# Patient Record
Sex: FEMALE | Race: WHITE | NOT HISPANIC OR LATINO | Employment: OTHER | URBAN - METROPOLITAN AREA
[De-identification: names, ages, dates, MRNs, and addresses within clinical notes are randomized per-mention and may not be internally consistent; named-entity substitution may affect disease eponyms.]

---

## 2017-05-10 NOTE — PATIENT DISCUSSION
(H01.004) Unspecified blepharitis left upper eyelid - Assesment : Examination revealed Blepharitis. - Plan : See Plan #1.

## 2017-05-10 NOTE — PATIENT DISCUSSION
(H00.011) Hordeolum externum right upper eyelid - Assesment : Examination revealed external hordeolum RUL. Mild hordeolum RLL as well. - Plan : Lower Keflex to 1 C PO BID. Continue warm compresses OU BID morning and evening to increase comfort and soften oil glands. Azasite OU BID (sample given to patient). Saroj mask recommended to ease compliance of warm compresses. Baby shampoo washes can be discontinued as they are irritating eyes. Ocusoft lid scrubs recommended in evenings to remove makeup and clean around eyes. Omega 3 / Fish Oil daily. Tobradex NEVILLE around OD to ease discomfort, but not necessary. Consider eyelid treatments routinely in future for mechanical removal of bacteria. RV in 1 Week for Follow Up. Sooner if having worsening symptoms.

## 2017-05-10 NOTE — PATIENT DISCUSSION
(H01.001) Unspecified blepharitis right upper eyelid - Assesment : Examination revealed Blepharitis. - Plan : See Plan #1.

## 2017-05-17 NOTE — PATIENT DISCUSSION
(H00.011) Hordeolum externum right upper eyelid - Assesment : Examination revealed external hordeolum RUL - improving. Lid everted OD - no FB evident. - Plan : Continue Keflex to 1 C PO BID until bottle runs out and continue warm compresses OU BID morning and evening to increase comfort and soften oil glands. Patient is leaving up [facility] this week. May consider lid hygiene when patient returns. Patient unsure when she is returning. RV PRN, sooner if symptoms worsen.

## 2017-05-17 NOTE — PATIENT DISCUSSION
(H01.001) Unspecified blepharitis right upper eyelid - Assesment : Examination revealed Blepharitis - improving. - Plan : See Plan #1.

## 2017-05-17 NOTE — PATIENT DISCUSSION
(H01.004) Unspecified blepharitis left upper eyelid - Assesment : Examination revealed Blepharitis - improving. - Plan : See Plan #1.

## 2019-12-11 ENCOUNTER — NEW PATIENT COMPREHENSIVE (OUTPATIENT)
Dept: URBAN - METROPOLITAN AREA CLINIC 32 | Facility: CLINIC | Age: 54
End: 2019-12-11

## 2019-12-11 DIAGNOSIS — Z98.890: ICD-10-CM

## 2019-12-11 DIAGNOSIS — H43.812: ICD-10-CM

## 2019-12-11 DIAGNOSIS — H25.13: ICD-10-CM

## 2019-12-11 PROCEDURE — 92225 OPHTHALMOSCOPY (INITIAL): CPT

## 2019-12-11 PROCEDURE — 92004 COMPRE OPH EXAM NEW PT 1/>: CPT

## 2019-12-11 PROCEDURE — G8482 FLU IMMUNIZE ORDER/ADMIN: HCPCS

## 2019-12-11 PROCEDURE — G8428 CUR MEDS NOT DOCUMENT: HCPCS

## 2019-12-11 PROCEDURE — 1036F TOBACCO NON-USER: CPT

## 2019-12-11 PROCEDURE — 92015 DETERMINE REFRACTIVE STATE: CPT

## 2019-12-11 PROCEDURE — G9903 PT SCRN TBCO ID AS NON USER: HCPCS

## 2019-12-11 PROCEDURE — 4040F PNEUMOC VAC/ADMIN/RCVD: CPT

## 2019-12-11 PROCEDURE — G8785 BP SCRN NO PERF AT INTERVAL: HCPCS

## 2019-12-11 ASSESSMENT — KERATOMETRY
OS_K2POWER_DIOPTERS: 31.25
OD_K1POWER_DIOPTERS: 42.5
OS_K1POWER_DIOPTERS: 42
OS_AXISANGLE_DEGREES: 105
OD_AXISANGLE2_DEGREES: 170
OD_AXISANGLE_DEGREES: 80
OD_K2POWER_DIOPTERS: 41.75
OS_AXISANGLE2_DEGREES: 15

## 2019-12-11 ASSESSMENT — TONOMETRY
OS_IOP_MMHG: 11
OD_IOP_MMHG: 13

## 2019-12-11 ASSESSMENT — VISUAL ACUITY
OS_SC: 20/20-1
OS_CC: J1
OD_SC: 20/20-1
OD_SC: J5
OD_CC: J1+
OS_SC: J7

## 2020-01-16 ASSESSMENT — KERATOMETRY
OD_AXISANGLE_DEGREES: 80
OD_K1POWER_DIOPTERS: 42.5
OS_K1POWER_DIOPTERS: 42
OS_AXISANGLE_DEGREES: 105
OD_K2POWER_DIOPTERS: 41.75
OD_AXISANGLE2_DEGREES: 170
OS_AXISANGLE2_DEGREES: 15
OS_K2POWER_DIOPTERS: 31.25

## 2020-01-17 ENCOUNTER — RETINA CONSULT (OUTPATIENT)
Dept: URBAN - METROPOLITAN AREA CLINIC 32 | Facility: CLINIC | Age: 55
End: 2020-01-17

## 2020-01-17 DIAGNOSIS — H43.812: ICD-10-CM

## 2020-01-17 DIAGNOSIS — H35.30: ICD-10-CM

## 2020-01-17 DIAGNOSIS — H43.811: ICD-10-CM

## 2020-01-17 PROCEDURE — 92250 FUNDUS PHOTOGRAPHY W/I&R: CPT

## 2020-01-17 PROCEDURE — 92014 COMPRE OPH EXAM EST PT 1/>: CPT

## 2020-01-17 PROCEDURE — 92201 OPSCPY EXTND RTA DRAW UNI/BI: CPT

## 2020-01-17 ASSESSMENT — TONOMETRY
OD_IOP_MMHG: 14
OS_IOP_MMHG: 12

## 2020-01-17 ASSESSMENT — VISUAL ACUITY
OS_SC: 20/20-1
OD_SC: 20/20

## 2021-12-21 ENCOUNTER — COMPREHENSIVE EXAM (OUTPATIENT)
Dept: URBAN - METROPOLITAN AREA CLINIC 32 | Facility: CLINIC | Age: 56
End: 2021-12-21

## 2021-12-21 DIAGNOSIS — H25.13: ICD-10-CM

## 2021-12-21 PROCEDURE — 92014 COMPRE OPH EXAM EST PT 1/>: CPT

## 2021-12-21 PROCEDURE — 92015 DETERMINE REFRACTIVE STATE: CPT

## 2021-12-21 ASSESSMENT — VISUAL ACUITY
OD_SC: 20/20
OS_CC: J2+2
OS_SC: J16
OS_SC: 20/20-2
OD_GLARE: 20/60
OD_SC: J10
OD_CC: J1
OS_GLARE: 20/60

## 2021-12-21 ASSESSMENT — KERATOMETRY
OD_AXISANGLE2_DEGREES: 170
OD_AXISANGLE_DEGREES: 80
OS_AXISANGLE2_DEGREES: 15
OS_K2POWER_DIOPTERS: 31.25
OS_AXISANGLE_DEGREES: 105
OD_K1POWER_DIOPTERS: 42.5
OD_K2POWER_DIOPTERS: 41.75
OS_K1POWER_DIOPTERS: 42

## 2021-12-21 ASSESSMENT — TONOMETRY
OS_IOP_MMHG: 16
OD_IOP_MMHG: 13

## 2022-06-04 ENCOUNTER — TELEPHONE ENCOUNTER (OUTPATIENT)
Dept: URBAN - METROPOLITAN AREA CLINIC 68 | Facility: CLINIC | Age: 57
End: 2022-06-04

## 2022-06-04 RX ORDER — SODIUM SULFATE, POTASSIUM SULFATE, MAGNESIUM SULFATE 17.5; 3.13; 1.6 G/ML; G/ML; G/ML
SOLUTION, CONCENTRATE ORAL AS DIRECTED
Qty: 1 | Refills: 0 | OUTPATIENT
Start: 2019-06-28 | End: 2019-06-29

## 2022-06-05 ENCOUNTER — TELEPHONE ENCOUNTER (OUTPATIENT)
Dept: URBAN - METROPOLITAN AREA CLINIC 68 | Facility: CLINIC | Age: 57
End: 2022-06-05

## 2022-06-25 ENCOUNTER — TELEPHONE ENCOUNTER (OUTPATIENT)
Age: 57
End: 2022-06-25

## 2022-06-25 RX ORDER — SODIUM SULFATE, POTASSIUM SULFATE, MAGNESIUM SULFATE 17.5; 3.13; 1.6 G/ML; G/ML; G/ML
SOLUTION, CONCENTRATE ORAL AS DIRECTED
Qty: 1 | Refills: 0 | OUTPATIENT
Start: 2019-06-28 | End: 2019-06-29

## 2022-06-26 ENCOUNTER — TELEPHONE ENCOUNTER (OUTPATIENT)
Age: 57
End: 2022-06-26

## 2023-06-22 ENCOUNTER — COMPREHENSIVE EXAM (OUTPATIENT)
Dept: URBAN - METROPOLITAN AREA CLINIC 32 | Facility: CLINIC | Age: 58
End: 2023-06-22

## 2023-06-22 DIAGNOSIS — H16.223: ICD-10-CM

## 2023-06-22 DIAGNOSIS — H02.834: ICD-10-CM

## 2023-06-22 DIAGNOSIS — H43.812: ICD-10-CM

## 2023-06-22 DIAGNOSIS — H02.831: ICD-10-CM

## 2023-06-22 DIAGNOSIS — H57.813: ICD-10-CM

## 2023-06-22 DIAGNOSIS — H25.13: ICD-10-CM

## 2023-06-22 PROCEDURE — 92015 DETERMINE REFRACTIVE STATE: CPT

## 2023-06-22 PROCEDURE — 99214 OFFICE O/P EST MOD 30 MIN: CPT

## 2023-06-22 PROCEDURE — 92134 CPTRZ OPH DX IMG PST SGM RTA: CPT

## 2023-06-22 ASSESSMENT — VISUAL ACUITY
OD_SC: J4
OD_SC: 20/20-2
OS_SC: J16
OS_CC: J2
OS_SC: 20/25
OD_CC: J1
OS_GLARE: 20/60-2
OD_GLARE: 20/30

## 2023-06-22 ASSESSMENT — KERATOMETRY
OS_AXISANGLE2_DEGREES: 16
OS_AXISANGLE_DEGREES: 106
OD_AXISANGLE_DEGREES: 80
OS_K2POWER_DIOPTERS: 41.25
OD_AXISANGLE2_DEGREES: 169
OD_AXISANGLE_DEGREES: 79
OS_K1POWER_DIOPTERS: 42
OS_AXISANGLE2_DEGREES: 15
OS_K1POWER_DIOPTERS: 41.75
OD_K1POWER_DIOPTERS: 42.50
OS_K2POWER_DIOPTERS: 31.25
OD_AXISANGLE2_DEGREES: 170
OD_K1POWER_DIOPTERS: 42.5
OD_K2POWER_DIOPTERS: 41.75
OD_K2POWER_DIOPTERS: 42.00
OS_AXISANGLE_DEGREES: 105

## 2023-06-22 ASSESSMENT — TONOMETRY
OS_IOP_MMHG: 13
OD_IOP_MMHG: 14

## 2024-06-03 ENCOUNTER — COMPREHENSIVE EXAM (OUTPATIENT)
Dept: URBAN - METROPOLITAN AREA CLINIC 32 | Facility: CLINIC | Age: 59
End: 2024-06-03

## 2024-06-03 DIAGNOSIS — H25.13: ICD-10-CM

## 2024-06-03 DIAGNOSIS — H43.812: ICD-10-CM

## 2024-06-03 DIAGNOSIS — H16.223: ICD-10-CM

## 2024-06-03 PROCEDURE — 92134 CPTRZ OPH DX IMG PST SGM RTA: CPT

## 2024-06-03 PROCEDURE — 99214 OFFICE O/P EST MOD 30 MIN: CPT

## 2024-06-03 ASSESSMENT — VISUAL ACUITY
OD_SC: 20/25
OS_CC: J2
OS_SC: 20/25
OD_CC: J1

## 2024-06-03 ASSESSMENT — KERATOMETRY
OS_K2POWER_DIOPTERS: 31.25
OS_K1POWER_DIOPTERS: 42
OS_AXISANGLE2_DEGREES: 15
OD_AXISANGLE_DEGREES: 80
OD_K2POWER_DIOPTERS: 41.75
OD_AXISANGLE2_DEGREES: 170
OD_K1POWER_DIOPTERS: 42.5
OS_AXISANGLE_DEGREES: 105

## 2024-06-03 ASSESSMENT — TONOMETRY
OS_IOP_MMHG: 11
OD_IOP_MMHG: 12